# Patient Record
Sex: MALE | Race: WHITE | URBAN - METROPOLITAN AREA
[De-identification: names, ages, dates, MRNs, and addresses within clinical notes are randomized per-mention and may not be internally consistent; named-entity substitution may affect disease eponyms.]

---

## 2020-01-08 ENCOUNTER — OFFICE VISIT (OUTPATIENT)
Dept: VASCULAR SURGERY | Age: 50
End: 2020-01-08
Payer: COMMERCIAL

## 2020-01-08 VITALS
HEIGHT: 70 IN | BODY MASS INDEX: 29.63 KG/M2 | DIASTOLIC BLOOD PRESSURE: 78 MMHG | WEIGHT: 207 LBS | SYSTOLIC BLOOD PRESSURE: 120 MMHG

## 2020-01-08 PROCEDURE — 99204 OFFICE O/P NEW MOD 45 MIN: CPT | Performed by: SURGERY

## 2020-01-08 ASSESSMENT — ENCOUNTER SYMPTOMS
RESPIRATORY NEGATIVE: 1
ALLERGIC/IMMUNOLOGIC NEGATIVE: 1
GASTROINTESTINAL NEGATIVE: 1
EYES NEGATIVE: 1

## 2020-01-08 NOTE — PROGRESS NOTES
Subjective:      Patient ID: Maile Brown is a 54 y.o. male. HPI Referral from Priya Marks MD for evaluation prior to a planned ALIF L5-S1 next week at [facility]. Patient complains of chronic back pain which has recently worsened to the point which he is unable to do normal activities for recreation which she has done in the past.  He has been able to continue to work but with limited amounts of activity. He is taking no narcotics for pain control. He complains of back and leg pain. No previous vascular interventions or abdominal surgery. History reviewed. No pertinent past medical history. Past Surgical History:   Procedure Laterality Date    INGUINAL HERNIA REPAIR Bilateral     SHOULDER SURGERY Right      Allergies no known allergies  No current outpatient medications on file. No current facility-administered medications for this visit.       Social History     Socioeconomic History    Marital status: Unknown     Spouse name: Not on file    Number of children: Not on file    Years of education: Not on file    Highest education level: Not on file   Occupational History    Not on file   Social Needs    Financial resource strain: Not on file    Food insecurity:     Worry: Not on file     Inability: Not on file    Transportation needs:     Medical: Not on file     Non-medical: Not on file   Tobacco Use    Smoking status: Former Smoker    Smokeless tobacco: Never Used   Substance and Sexual Activity    Alcohol use: Not on file    Drug use: Not on file    Sexual activity: Not on file   Lifestyle    Physical activity:     Days per week: Not on file     Minutes per session: Not on file    Stress: Not on file   Relationships    Social connections:     Talks on phone: Not on file     Gets together: Not on file     Attends Faith service: Not on file     Active member of club or organization: Not on file     Attends meetings of clubs or organizations: Not on file Relationship status: Not on file    Intimate partner violence:     Fear of current or ex partner: Not on file     Emotionally abused: Not on file     Physically abused: Not on file     Forced sexual activity: Not on file   Other Topics Concern    Not on file   Social History Narrative    Not on file     Family History   Family history unknown: Yes     Review of Systems   Constitutional: Negative. HENT: Negative. Eyes: Negative. Respiratory: Negative. Cardiovascular: Negative. Gastrointestinal: Negative. Endocrine: Negative. Genitourinary: Negative. Musculoskeletal: Negative. Skin: Negative. Allergic/Immunologic: Negative. Neurological: Negative. Hematological: Negative. Psychiatric/Behavioral: Negative. Objective:   Physical Exam  Vitals signs and nursing note reviewed. Constitutional:       Appearance: Normal appearance. HENT:      Head: Normocephalic and atraumatic. Nose: Nose normal.      Mouth/Throat:      Mouth: Mucous membranes are moist.      Pharynx: Oropharynx is clear. Eyes:      Extraocular Movements: Extraocular movements intact. Conjunctiva/sclera: Conjunctivae normal.   Neck:      Musculoskeletal: Neck supple. Cardiovascular:      Rate and Rhythm: Normal rate and regular rhythm. Pulses: Normal pulses. Pulmonary:      Effort: Pulmonary effort is normal.      Breath sounds: Normal breath sounds. Abdominal:      General: Abdomen is flat. Bowel sounds are normal.      Palpations: Abdomen is soft. There is no mass. Musculoskeletal:         General: No deformity. Skin:     General: Skin is warm and dry. Capillary Refill: Capillary refill takes less than 2 seconds. Neurological:      General: No focal deficit present. Mental Status: He is alert and oriented to person, place, and time. Cranial Nerves: No cranial nerve deficit. Sensory: No sensory deficit. Motor: No weakness.       Coordination: Coordination normal.      Gait: Gait normal.   Psychiatric:         Mood and Affect: Mood normal.         Behavior: Behavior normal.         Thought Content: Thought content normal.         Judgment: Judgment normal.         Assessment:      Degenerative lumbar disc disease with planned ALIF L5S1      Plan:      No identified contraindications to proceeding from a vascular or surgical standpoint. My role in the operation as well as the risks were explained. The risks include vessel injury with bleeding or thrombosis, ureter/bladder/bowel injury, nerve injury, retrograde ejaculation, hernia and wound infections. He and his wife understand this in detail and are anxious to proceed.

## 2020-01-08 NOTE — LETTER
[address] [facility] [address]  [place] [address]  Phone: [phone]  Fax: [phone]    Sejal Cooper M.D. January 8, 2020       Patient: Yogi Monet   MR Number: <[id number]>   YOB: 1970   Date of Visit: 1/8/2020       Dear Dr. Ramiro Cedeño:    I saw Yogi Monet in the office today for evaluation prior to his planned anterior lumbar interbody fusion this coming week. I do not find any contraindication to proceeding with the planned surgery. He understands my role in the operation and I look forward to assisting you in this case. If you have questions, please do not hesitate to call me. I look forward to following Arlene Jurado along with you.     Sincerely,        Christel Hylton MD    CC providers:  MD Aaliyah Lerner [facility] [address]  [address] [facility]

## 2020-01-14 ENCOUNTER — OUTSIDE SERVICES (OUTPATIENT)
Dept: VASCULAR SURGERY | Age: 50
End: 2020-01-14
Payer: COMMERCIAL

## 2020-01-14 PROCEDURE — 22558 ARTHRD ANT NTRBD MIN DSC LUM: CPT | Performed by: SURGERY
